# Patient Record
Sex: MALE | Race: WHITE | Employment: OTHER | URBAN - METROPOLITAN AREA
[De-identification: names, ages, dates, MRNs, and addresses within clinical notes are randomized per-mention and may not be internally consistent; named-entity substitution may affect disease eponyms.]

---

## 2022-05-05 ENCOUNTER — OFFICE VISIT (OUTPATIENT)
Dept: ORTHOPEDIC SURGERY | Age: 77
End: 2022-05-05
Payer: MEDICARE

## 2022-05-05 VITALS — HEART RATE: 78 BPM | WEIGHT: 198 LBS | OXYGEN SATURATION: 98 % | HEIGHT: 71 IN | BODY MASS INDEX: 27.72 KG/M2

## 2022-05-05 DIAGNOSIS — Z87.39 HISTORY OF GOUT: ICD-10-CM

## 2022-05-05 DIAGNOSIS — M17.11 PRIMARY OSTEOARTHRITIS OF RIGHT KNEE: Primary | ICD-10-CM

## 2022-05-05 DIAGNOSIS — M25.561 ACUTE PAIN OF RIGHT KNEE: ICD-10-CM

## 2022-05-05 PROCEDURE — G8427 DOCREV CUR MEDS BY ELIG CLIN: HCPCS | Performed by: SPECIALIST

## 2022-05-05 PROCEDURE — G8432 DEP SCR NOT DOC, RNG: HCPCS | Performed by: SPECIALIST

## 2022-05-05 PROCEDURE — G8419 CALC BMI OUT NRM PARAM NOF/U: HCPCS | Performed by: SPECIALIST

## 2022-05-05 PROCEDURE — G8536 NO DOC ELDER MAL SCRN: HCPCS | Performed by: SPECIALIST

## 2022-05-05 PROCEDURE — 3017F COLORECTAL CA SCREEN DOC REV: CPT | Performed by: SPECIALIST

## 2022-05-05 PROCEDURE — 20610 DRAIN/INJ JOINT/BURSA W/O US: CPT | Performed by: SPECIALIST

## 2022-05-05 PROCEDURE — 99203 OFFICE O/P NEW LOW 30 MIN: CPT | Performed by: SPECIALIST

## 2022-05-05 PROCEDURE — 1101F PT FALLS ASSESS-DOCD LE1/YR: CPT | Performed by: SPECIALIST

## 2022-05-05 RX ORDER — FUROSEMIDE 20 MG/1
TABLET ORAL
COMMUNITY
Start: 2022-03-15

## 2022-05-05 RX ORDER — ROSUVASTATIN CALCIUM 5 MG/1
TABLET, COATED ORAL
COMMUNITY
Start: 2022-03-31

## 2022-05-05 RX ORDER — NITROGLYCERIN 0.4 MG/1
TABLET SUBLINGUAL
COMMUNITY

## 2022-05-05 RX ORDER — ALLOPURINOL 100 MG/1
TABLET ORAL
COMMUNITY
Start: 2021-09-17

## 2022-05-05 RX ORDER — ATORVASTATIN CALCIUM 10 MG/1
TABLET, FILM COATED ORAL
COMMUNITY
Start: 2022-02-10

## 2022-05-05 RX ORDER — BETAMETHASONE SODIUM PHOSPHATE AND BETAMETHASONE ACETATE 3; 3 MG/ML; MG/ML
3 INJECTION, SUSPENSION INTRA-ARTICULAR; INTRALESIONAL; INTRAMUSCULAR; SOFT TISSUE ONCE
Status: COMPLETED | OUTPATIENT
Start: 2022-05-05 | End: 2022-05-05

## 2022-05-05 RX ORDER — CARVEDILOL 6.25 MG/1
TABLET ORAL
COMMUNITY
Start: 2021-12-09

## 2022-05-05 RX ORDER — LIDOCAINE 50 MG/G
PATCH TOPICAL
COMMUNITY
Start: 2022-05-02

## 2022-05-05 RX ADMIN — BETAMETHASONE SODIUM PHOSPHATE AND BETAMETHASONE ACETATE 3 MG: 3; 3 INJECTION, SUSPENSION INTRA-ARTICULAR; INTRALESIONAL; INTRAMUSCULAR; SOFT TISSUE at 10:14

## 2022-05-05 NOTE — PROGRESS NOTES
Patient: Matthew Marsh                MRN: [id number]       SSN: [id number]  YOB: 1945        AGE: 78 y.o. SEX: male    PCP: None  05/05/22    Chief Complaint   Patient presents with    Knee Pain     right knee     HISTORY:  Matthew Marsh is a 78 y.o. male who is seen for right knee pain. He was seen at the [facility] ED on 4/29/22 and the [facility] ED on 4/30/22 where right knee x-rays were negative. He was provided a prescription for Lidoderm pain patches which have been helpful. He has been experiencing sharp anterolateral right knee pain for the past week. He denies any swelling. He scheduled an appointment here as he couldn't get an appointment before 5/19/22 in [place]. He does not recall any injury. He feels pain with standing, walking and stair climbing. He experiences startup pain after sitting. He has a h/o gout for which he takes allopurinol. He says his current knee pain is different than his usual gout pain. Oxycodone and ibuprofen have not been helpful. Pain Assessment  5/5/2022   Location of Pain Knee   Location Modifiers Right   Severity of Pain 4   Quality of Pain Aching; Sharp   Frequency of Pain Constant   Date Pain First Started (No Data)   Date Pain First Started Comment 1 week ago   Aggravating Factors Bending;Stretching   Limiting Behavior Yes   Relieving Factors Other (Comment)   Relieving Factors Comment patches   Result of Injury No     Occupation, etc:  Mr. Keila Desai is retired. He used to work as a  and self employed gracia. He lives with his wife in [place]. His son Jolynn Tyler lives in [place]. He also has 5 daughters and 16 grandchildren. His brother is in poor health. Mr. Keila Desai weighs 198 lbs and is 5'11\" tall. No results found for: HBA1C, IEK8HRKX, SUC3OVAT, YFO6MRWX  Weight Metrics 5/5/2022   Weight 198 lb   BMI 27.62 kg/m2       There is no problem list on file for this patient.     REVIEW OF SYSTEMS: Constitutional Symptoms: Negative   Eyes: Negative   Ears, Nose, Throat and Mouth: Negative   Cardiovascular: Negative   Respiratory: Negative   Genitourinary: Per HPI   Gastrointestinal: Per HPI   Integumentary (Skin and/or Breast): Negative   Musculoskeletal: Per HPI   Endocrine/Rheumatologic: Negative   Neurological: Per HPI   Hematology/Lymphatic: Negative    Allergic/Immunologic: Negative   Phychiatric: Negative    Social History     Socioeconomic History    Marital status: SINGLE     Spouse name: Not on file    Number of children: Not on file    Years of education: Not on file    Highest education level: Not on file   Occupational History    Not on file   Tobacco Use    Smoking status: Former Smoker    Smokeless tobacco: Never Used   Substance and Sexual Activity    Alcohol use: Not on file    Drug use: Not on file    Sexual activity: Not on file   Other Topics Concern    Not on file   Social History Narrative    Not on file     Social Determinants of Health     Financial Resource Strain:     Difficulty of Paying Living Expenses: Not on file   Food Insecurity:     Worried About Running Out of Food in the Last Year: Not on file    Lorie of Food in the Last Year: Not on file   Transportation Needs:     Lack of Transportation (Medical): Not on file    Lack of Transportation (Non-Medical):  Not on file   Physical Activity:     Days of Exercise per Week: Not on file    Minutes of Exercise per Session: Not on file   Stress:     Feeling of Stress : Not on file   Social Connections:     Frequency of Communication with Friends and Family: Not on file    Frequency of Social Gatherings with Friends and Family: Not on file    Attends [employer] Services: Not on file    Active Member of Clubs or Organizations: Not on file    Attends Club or Organization Meetings: Not on file    Marital Status: Not on file   Intimate Partner Violence:     Fear of Current or Ex-Partner: Not on file   Rachel Emotionally Abused: Not on file    Physically Abused: Not on file    Sexually Abused: Not on file   Housing Stability:     Unable to Pay for Housing in the Last Year: Not on file    Number of Places Lived in the Last Year: Not on file    Unstable Housing in the Last Year: Not on file      Not on File   Current Outpatient Medications   Medication Sig    allopurinoL (ZYLOPRIM) 100 mg tablet allopurinol 100 mg tablet    atorvastatin (LIPITOR) 10 mg tablet     carvediloL (COREG) 6.25 mg tablet carvedilol 6.25 mg tablet    furosemide (LASIX) 20 mg tablet     lidocaine (LIDODERM) 5 % PLEASE SEE ATTACHED FOR DETAILED DIRECTIONS    rosuvastatin (CRESTOR) 5 mg tablet rosuvastatin 5 mg tablet   Take 1 tablet every day by oral route.  nitroglycerin (NITROSTAT) 0.4 mg SL tablet nitroglycerin 0.4 mg sublingual tablet     No current facility-administered medications for this visit.       PHYSICAL EXAMINATION:  Visit Vitals  Pulse 78   Ht 5' 11\" (1.803 m)   Wt 198 lb (89.8 kg)   SpO2 98%   BMI 27.62 kg/m²      ORTHO EXAMINATION:  Examination Right knee Left knee   Skin Intact Intact   Range of motion 100-15 120-0   Effusion - -   Medial joint line tenderness - -   Lateral joint line tenderness + anterolateral -   Popliteal tenderness - -   Osteophytes palpable - -   Oleksandrs - -   Patella crepitus - -   Anterior drawer - -   Lateral laxity - -   Medial laxity - -   Varus deformity - -   Valgus deformity - -   Pretibial edema - -   Calf tenderness - -     TIME OUT:  Chart reviewed for the following:   I, Pavan Hagen MD, have reviewed the History, Physical and updated the Allergic reactions for [place]   TIME OUT performed immediately prior to start of procedure:  Bhavani Valerio MD, have performed the following reviews on José Miguel Henderson prior to the start of the procedure:          * Patient was identified by name and date of birth   * Agreement on procedure being performed was verified  * Risks and Benefits explained to the patient  * Procedure site verified and marked as necessary  * Patient was positioned for comfort  * Consent was obtained     Time: 9:56 AM     Date of procedure: 5/5/2022  Procedure performed by:  Pavan Hagen MD  Mr. Henderson tolerated the procedure well with no complications. RADIOGRAPHS:  XR RIGHT KNEE 4/29/22 [facility] ED  IMPRESSION   Mild degenerative changes of osteoarthritis. XR RIGHT KNEE 4/30/22 [facility] ED  IMPRESSION:   1. No osseous abnormality of the right knee. IMPRESSION:      ICD-10-CM ICD-9-CM    1. Primary osteoarthritis of right knee  M17.11 715.16 betamethasone (CELESTONE) injection 3 mg      DRAIN/INJECT LARGE JOINT/BURSA      REFERRAL TO PHYSICAL THERAPY   2. Acute pain of right knee  M25.561 719.46 betamethasone (CELESTONE) injection 3 mg      DRAIN/INJECT LARGE JOINT/BURSA      REFERRAL TO PHYSICAL THERAPY   3. History of gout  Z87.39 V12.29      PLAN: He will start a brief course of outpatient physical therapy. After discussing treatment options, patient's right knee was injected with 4 cc Marcaine and 1/2 cc Celestone. We discussed a possible need for a right knee MRI in the future if pain continues. He will follow up with his orthopedist in [place] in a couple of weeks.     Scribed by Pavan Hagen MD [address]) as dictated by Pavan Hagen MD

## 2022-05-25 ENCOUNTER — TELEPHONE (OUTPATIENT)
Dept: ORTHOPEDIC SURGERY | Age: 77
End: 2022-05-25

## 2022-05-25 NOTE — TELEPHONE ENCOUNTER
Spoke with patient and he is going to get the name and number/fax so we can fax the physical therapy order to them.

## 2022-05-25 NOTE — TELEPHONE ENCOUNTER
[employer] tried to reach patient to get scheduled for physical therapy and patient told them that would like to go somewhere closer to home in [place] not sure of location

## 2023-02-14 DIAGNOSIS — M17.11 PRIMARY OSTEOARTHRITIS OF RIGHT KNEE: Primary | ICD-10-CM

## 2023-02-14 DIAGNOSIS — M25.561 ACUTE PAIN OF RIGHT KNEE: ICD-10-CM
